# Patient Record
Sex: MALE | Race: BLACK OR AFRICAN AMERICAN | NOT HISPANIC OR LATINO | URBAN - METROPOLITAN AREA
[De-identification: names, ages, dates, MRNs, and addresses within clinical notes are randomized per-mention and may not be internally consistent; named-entity substitution may affect disease eponyms.]

---

## 2017-05-21 ENCOUNTER — EMERGENCY (EMERGENCY)
Facility: HOSPITAL | Age: 47
LOS: 1 days | Discharge: HOME | End: 2017-05-21
Attending: EMERGENCY MEDICINE | Admitting: EMERGENCY MEDICINE
Payer: COMMERCIAL

## 2017-05-21 VITALS
OXYGEN SATURATION: 100 % | RESPIRATION RATE: 17 BRPM | HEART RATE: 65 BPM | DIASTOLIC BLOOD PRESSURE: 95 MMHG | TEMPERATURE: 98 F | SYSTOLIC BLOOD PRESSURE: 158 MMHG

## 2017-05-21 PROCEDURE — 73564 X-RAY EXAM KNEE 4 OR MORE: CPT | Mod: 26,RT

## 2017-05-21 PROCEDURE — 73590 X-RAY EXAM OF LOWER LEG: CPT | Mod: 26,RT

## 2017-05-21 PROCEDURE — 73600 X-RAY EXAM OF ANKLE: CPT | Mod: 26,RT

## 2017-05-21 PROCEDURE — 99283 EMERGENCY DEPT VISIT LOW MDM: CPT

## 2017-05-21 PROCEDURE — 73130 X-RAY EXAM OF HAND: CPT | Mod: 26,RT

## 2017-05-21 NOTE — ED PROVIDER NOTE - PHYSICAL EXAMINATION
1.The affected finger is not held in slight flexion. 2.There is no uniform swelling over the affected tendon. 3.There is tenderness over the affected tendon. 4.There is no pain on passive extension of the affected finger.

## 2017-05-21 NOTE — ED PROVIDER NOTE - MEDICAL DECISION MAKING DETAILS
46 YOM no PMH fell down steps two weeks ago twisting his right leg c/o right medial knee pain and right medial ankle pain. VSS WNL.  Exam consistent with soft tissue injury vs occult fracture.  Radiographs, pain control, follow up.

## 2017-05-21 NOTE — ED PROVIDER NOTE - OBJECTIVE STATEMENT
46 YOM no PMH fell down steps two weeks ago twisting his right leg c/o right medial knee pain and right medial ankle pain.  No redness, swelling, other symptoms. Pt ambulatory full ROM.  No fevers, chills, sweats, weight loss, fatigue, or malaise. PT also complained of right index finger pain that he "wanted to cut into with a knife."  No redness, swelling, other symptoms.  Full ROM of the right index finger.  No history of trauma or exposure.

## 2017-05-21 NOTE — ED PROVIDER NOTE - ATTENDING CONTRIBUTION TO CARE
ED Attending Dr. Vasquez: 45 yo male with pain to right knee and ankle x 2 weeks beginning after fall down steps.  Also isolated right 2nd digit pain.  No other injuries.  On exam both LEs well appearing, no deformity.  Right knee with TTP along medial joint line and right ankle with TTP along medial malleolus.  Full ROM to affected extremity, pt able to ambulate.

## 2017-05-21 NOTE — ED PROVIDER NOTE - PROGRESS NOTE DETAILS
JW Radiographs reveal no acute FX or dislocation in the knee or ankle.  No signs of infection in the finger.  I reviewed the alarm symptoms of this patient's diagnosis and discussed criteria for their return to the emergency department.  I instructed the patient to return to the emergency department with any alarm symptoms for their specific diagnosis including redness, swelling, pain, any worsening symptoms, and any other concerns.  I instructed this patient to call their primary doctor today, to inform them of their visit to the emergency department, and to obtain a repeat evaluation in the next 24 hours.  This patient understood and agreed with our plan for follow up and felt safe returning home.  At the time of discharge this patient remained in stable condition, in no acute distress, with stable vital signs.

## 2017-05-21 NOTE — ED ADULT TRIAGE NOTE - CHIEF COMPLAINT QUOTE
pt states he slipped on a step and twisting his R leg. knee noted to be swollen. no deformity noted.